# Patient Record
(demographics unavailable — no encounter records)

---

## 2025-03-18 NOTE — PHYSICAL EXAM
[2+] : left foot dorsalis pedis 2+ [No Focal Deficits] : no focal deficits [Ankle Swelling (On Exam)] : not present [FreeTextEntry3] : CFT: 3 seconds x 10.  Temperature gradient: warm to cool. [de-identified] : Hammertoes 2 through 5, bilateral with elongated 2nd and 3rd digits, bilateral.    [FreeTextEntry1] : Light touch and epicritic sensations are intact.

## 2025-03-18 NOTE — PHYSICAL EXAM
[2+] : left foot dorsalis pedis 2+ [No Focal Deficits] : no focal deficits [Ankle Swelling (On Exam)] : not present [FreeTextEntry3] : CFT: 3 seconds x 10.  Temperature gradient: warm to cool. [de-identified] : Hammertoes 2 through 5, bilateral with elongated 2nd and 3rd digits, bilateral.    [FreeTextEntry1] : Light touch and epicritic sensations are intact.

## 2025-03-18 NOTE — HISTORY OF PRESENT ILLNESS
[FreeTextEntry1] : Patient returns today for management of painful, thickened, elongated onychomycotic nails which she cannot cut herself.  Patient has been using topical Ciclopirox for approximately one year.  She did notice some improvement but the nail abnormalities did not fully resolve.  Patient denies any new medical changes.

## 2025-03-18 NOTE — ASSESSMENT
[FreeTextEntry1] : Impression: Painful onychomycosis (B35.1) (M79.674, M79.675).  Treatment: Topical antifungals are unlikely to further result in clinical improvement, as patient has used it for approximately one year.  There is most likely onychodystrophy from having hammered digits and the nails rubbing against the shoe gear.  Patient can discontinue the topical Ciclopirox.  Will continue with palliative debridements.   Nails 1 through 3, bilaterally were debrided of excessive thickness and length to appropriate levels of comfort and contour using sterile nippers and Dremel.   The procedure was tolerated well without complications.   Return: 10 weeks.

## 2025-06-06 NOTE — PHYSICAL EXAM
[Ankle Swelling (On Exam)] : not present [Varicose Veins Of Lower Extremities] : not present [] : present [Delayed in the Right Toes] : capillary refills normal in right toes [Delayed in the Left Toes] : capillary refills normal in the left toes [2+] : left foot dorsalis pedis 2+ [de-identified] : Hammertoes 2 through 5, bilateral with elongated 2nd and 3rd digits, bilateral. [FreeTextEntry1] : Skin turgor is thin. Negative hair growth. Negative edema, erythema or interdigital macerations. Nails 1 through 3, bilateral are thickened up to 3mm, yellow, elongated with subungual debris, splintering. Bilateral hallux nails are deeply incurvated into the skin with pain on palpation. No clinical signs of infection, spicules or [Sensation] : the sensory exam was normal to light touch and pinprick [No Focal Deficits] : no focal deficits

## 2025-06-06 NOTE — HISTORY OF PRESENT ILLNESS
[FreeTextEntry1] : Patient is seen today for painful onychomycosis nails which patient can't cut herself. Complains of ingrowns, no redness/ swelling/ drainage. Recently diagnosed with arrhythmia by cardiology, no other medical changes,

## 2025-06-06 NOTE — ASSESSMENT
[FreeTextEntry1] : Nails 1 through 3, bilaterally were debrided of excessive thickness and length to appropriate levels of comfort and contour using sterile nippers and Dremel. The procedure was tolerated well without complications. All incurvated nail edges resected via slant back,. no signs of infection Return: 10 weeks.